# Patient Record
Sex: MALE | Race: WHITE | NOT HISPANIC OR LATINO | Employment: OTHER | URBAN - NONMETROPOLITAN AREA
[De-identification: names, ages, dates, MRNs, and addresses within clinical notes are randomized per-mention and may not be internally consistent; named-entity substitution may affect disease eponyms.]

---

## 2024-08-21 ENCOUNTER — OFFICE VISIT (OUTPATIENT)
Dept: FAMILY MEDICINE CLINIC | Facility: CLINIC | Age: 49
End: 2024-08-21
Payer: COMMERCIAL

## 2024-08-21 VITALS
DIASTOLIC BLOOD PRESSURE: 84 MMHG | TEMPERATURE: 97 F | OXYGEN SATURATION: 97 % | HEART RATE: 86 BPM | HEIGHT: 66 IN | BODY MASS INDEX: 26.52 KG/M2 | SYSTOLIC BLOOD PRESSURE: 120 MMHG | WEIGHT: 165 LBS

## 2024-08-21 DIAGNOSIS — Z12.5 SCREENING FOR PROSTATE CANCER: ICD-10-CM

## 2024-08-21 DIAGNOSIS — L98.9 SKIN LESION: ICD-10-CM

## 2024-08-21 DIAGNOSIS — R10.9 STOMACH PAIN: ICD-10-CM

## 2024-08-21 DIAGNOSIS — R19.7 DIARRHEA, UNSPECIFIED TYPE: ICD-10-CM

## 2024-08-21 DIAGNOSIS — F17.200 SMOKER: ICD-10-CM

## 2024-08-21 DIAGNOSIS — Z11.4 SCREENING FOR HIV (HUMAN IMMUNODEFICIENCY VIRUS): ICD-10-CM

## 2024-08-21 DIAGNOSIS — Z00.00 ENCOUNTER FOR MEDICAL EXAMINATION TO ESTABLISH CARE: Primary | ICD-10-CM

## 2024-08-21 PROCEDURE — 99213 OFFICE O/P EST LOW 20 MIN: CPT | Performed by: NURSE PRACTITIONER

## 2024-08-21 PROCEDURE — 99386 PREV VISIT NEW AGE 40-64: CPT | Performed by: NURSE PRACTITIONER

## 2024-08-21 NOTE — PROGRESS NOTES
Ambulatory Visit  Name: Raúl Cadena      : 1975      MRN: [id number]  Encounter Provider: LUIZ Awad  Encounter Date: 2024   [facility]  Pt is a 48 yr old male   Presents in office to establish care and due for annual physical   Has had some issues with diarrhea and stomach issues   Discussed work up ordered   He is a smoker.  Will need to see Derm for skin evaluation   Assessment & Plan   1. Encounter for medical examination to establish care  Comments:  discussed healthy diet and exercise   marisol quate hydration   preventative medicne  2. Stomach pain  -     Ambulatory Referral to Gastroenterology; Future  -     Comprehensive metabolic panel  -     CBC and differential  -     TSH, 3rd generation with Free T4 reflex  -     Lipid panel  -     Iron Panel (Includes Ferritin, Iron Sat%, Iron, and TIBC)  -     Sedimentation rate, automated  -     C-reactive protein  -     Lipase  -     Amylase  -     Hepatitis panel, acute  -     H. pylori antigen, stool  3. Diarrhea, unspecified type  -     H. pylori antigen, stool  4. Smoker  5. Screening for HIV (human immunodeficiency virus)  -     HIV 1/2 AG/AB w Reflex [facility] for 2 yr old and above  6. Skin lesion  -     Ambulatory Referral to Dermatology; Future  7. Screening for prostate cancer  -     PSA, Total Screen      Depression Screening and Follow-up Plan: Patient was screened for depression during today's encounter. They screened negative with a PHQ-2 score of 0.    Tobacco Cessation Counseling: Tobacco cessation counseling was provided. The patient is sincerely urged to quit consumption of tobacco. He is ready to quit tobacco. Medication options and side effects of medication discussed.       History of Present Illness     Pt is a 48 yr old male   Presents in office to establish care and due for annual physical   Has had some issues with diarrhea and stomach issues   Discussed work up ordered    He is a smoker.  Will need to see Derm for skin evaluation         Review of Systems   Constitutional:  Positive for fatigue. Negative for fever and unexpected weight change.   HENT:  Negative for congestion, hearing loss, sinus pressure and sore throat.    Eyes: Negative.    Respiratory:  Negative for cough and shortness of breath.         Smoker    Cardiovascular:  Negative for chest pain and palpitations.   Gastrointestinal:  Positive for abdominal distention and diarrhea. Negative for abdominal pain and nausea.   Endocrine: Negative.    Genitourinary:  Negative for difficulty urinating and flank pain.   Musculoskeletal:  Positive for arthralgias and myalgias.   Skin:  Negative for rash.   Neurological:  Negative for headaches.   Hematological:  Negative for adenopathy.   Psychiatric/Behavioral:  Negative for self-injury, sleep disturbance and suicidal ideas.      Pertinent Medical History   Reviewed       Medical History Reviewed by provider this encounter:  Tobacco  Allergies  Meds  Problems  Med Hx  Surg Hx  Fam Hx       Past Medical History   Past Medical History:   Diagnosis Date    COPD (chronic obstructive pulmonary disease) ([facility]) 2010    I usually get bronchitis at least once a year    GERD (gastroesophageal reflux disease)     Otitis media     Since birth     History reviewed. No pertinent surgical history.  History reviewed. No pertinent family history.  No current outpatient medications on file prior to visit.     No current facility-administered medications on file prior to visit.     Allergies   Allergen Reactions    Lactose - Food Allergy GI Intolerance      No current outpatient medications on file prior to visit.     No current facility-administered medications on file prior to visit.      Social History     Tobacco Use    Smoking status: Every Day     Current packs/day: 1.00     Average packs/day: 1 pack/day for 30.0 years (30.0 ttl pk-yrs)     Types: Cigarettes    Smokeless tobacco:  "Never   Vaping Use    Vaping status: Never Used   Substance and Sexual Activity    Alcohol use: Not Currently    Drug use: Never    Sexual activity: Yes     Partners: Female     Birth control/protection: None     Objective     /84 (BP Location: Left arm, Patient Position: Sitting, Cuff Size: Adult)   Pulse 86   Temp (!) 97 °F (36.1 °C)   Ht 5' 6\" (1.676 m)   Wt 74.8 kg (165 lb)   SpO2 97%   BMI 26.63 kg/m²     Physical Exam  Vitals and nursing note reviewed.   Constitutional:       General: He is not in acute distress.     Appearance: He is well-developed.   HENT:      Head: Normocephalic and atraumatic.   Eyes:      Conjunctiva/sclera: Conjunctivae normal.   Cardiovascular:      Rate and Rhythm: Normal rate and regular rhythm.      Heart sounds: No murmur heard.  Pulmonary:      Effort: Pulmonary effort is normal. No respiratory distress.      Breath sounds: Normal breath sounds.   Abdominal:      General: There is distension.      Palpations: Abdomen is soft.      Tenderness: There is no abdominal tenderness.   Musculoskeletal:         General: No swelling.      Cervical back: Neck supple.   Skin:     General: Skin is warm and dry.      Capillary Refill: Capillary refill takes less than 2 seconds.   Neurological:      General: No focal deficit present.      Mental Status: He is alert and oriented to person, place, and time.   Psychiatric:         Mood and Affect: Mood normal.         Behavior: Behavior normal.       Administrative Statements   I have spent a total time of 35  minutes in caring for this patient on the day of the visit/encounter including Risks and benefits of tx options, Instructions for management, Patient and family education, Importance of tx compliance, Risk factor reductions, Impressions, Counseling / Coordination of care, Documenting in the medical record, Reviewing / ordering tests, medicine, procedures  , and Obtaining or reviewing history  .      "

## 2024-08-22 ENCOUNTER — LAB (OUTPATIENT)
Dept: LAB | Facility: CLINIC | Age: 49
End: 2024-08-22
Payer: COMMERCIAL

## 2024-08-22 DIAGNOSIS — R10.9 STOMACH PAIN: Primary | ICD-10-CM

## 2024-08-22 LAB
ALBUMIN SERPL BCG-MCNC: 4.3 G/DL (ref 3.5–5)
ALP SERPL-CCNC: 107 U/L (ref 34–104)
ALT SERPL W P-5'-P-CCNC: 23 U/L (ref 7–52)
AMYLASE SERPL-CCNC: 28 IU/L (ref 29–103)
ANION GAP SERPL CALCULATED.3IONS-SCNC: 10 MMOL/L (ref 4–13)
AST SERPL W P-5'-P-CCNC: 19 U/L (ref 13–39)
BASOPHILS # BLD AUTO: 0.07 THOUSANDS/ÂΜL (ref 0–0.1)
BASOPHILS NFR BLD AUTO: 1 % (ref 0–1)
BILIRUB SERPL-MCNC: 0.56 MG/DL (ref 0.2–1)
BILIRUB UR QL STRIP: NEGATIVE
BUN SERPL-MCNC: 11 MG/DL (ref 5–25)
CALCIUM SERPL-MCNC: 9.5 MG/DL (ref 8.4–10.2)
CHLORIDE SERPL-SCNC: 102 MMOL/L (ref 96–108)
CHOLEST SERPL-MCNC: 240 MG/DL
CLARITY UR: CLEAR
CO2 SERPL-SCNC: 28 MMOL/L (ref 21–32)
COLOR UR: NORMAL
CREAT SERPL-MCNC: 0.97 MG/DL (ref 0.6–1.3)
CRP SERPL QL: 6.7 MG/L
EOSINOPHIL # BLD AUTO: 0.29 THOUSAND/ÂΜL (ref 0–0.61)
EOSINOPHIL NFR BLD AUTO: 3 % (ref 0–6)
ERYTHROCYTE [DISTWIDTH] IN BLOOD BY AUTOMATED COUNT: 13 % (ref 11.6–15.1)
ERYTHROCYTE [SEDIMENTATION RATE] IN BLOOD: 17 MM/HOUR (ref 0–14)
FERRITIN SERPL-MCNC: 91 NG/ML (ref 24–336)
GFR SERPL CREATININE-BSD FRML MDRD: 91 ML/MIN/1.73SQ M
GLUCOSE P FAST SERPL-MCNC: 68 MG/DL (ref 65–99)
GLUCOSE UR STRIP-MCNC: NEGATIVE MG/DL
HCT VFR BLD AUTO: 49 % (ref 36.5–49.3)
HDLC SERPL-MCNC: 26 MG/DL
HGB BLD-MCNC: 16.1 G/DL (ref 12–17)
HGB UR QL STRIP.AUTO: NEGATIVE
IMM GRANULOCYTES # BLD AUTO: 0.08 THOUSAND/UL (ref 0–0.2)
IMM GRANULOCYTES NFR BLD AUTO: 1 % (ref 0–2)
IRON SATN MFR SERPL: 41 % (ref 15–50)
IRON SERPL-MCNC: 132 UG/DL (ref 50–212)
KETONES UR STRIP-MCNC: NEGATIVE MG/DL
LDLC SERPL CALC-MCNC: 181 MG/DL (ref 0–100)
LEUKOCYTE ESTERASE UR QL STRIP: NEGATIVE
LIPASE SERPL-CCNC: 9 U/L (ref 11–82)
LYMPHOCYTES # BLD AUTO: 3.38 THOUSANDS/ÂΜL (ref 0.6–4.47)
LYMPHOCYTES NFR BLD AUTO: 32 % (ref 14–44)
MCH RBC QN AUTO: 31.4 PG (ref 26.8–34.3)
MCHC RBC AUTO-ENTMCNC: 32.9 G/DL (ref 31.4–37.4)
MCV RBC AUTO: 96 FL (ref 82–98)
MONOCYTES # BLD AUTO: 0.52 THOUSAND/ÂΜL (ref 0.17–1.22)
MONOCYTES NFR BLD AUTO: 5 % (ref 4–12)
NEUTROPHILS # BLD AUTO: 6.1 THOUSANDS/ÂΜL (ref 1.85–7.62)
NEUTS SEG NFR BLD AUTO: 58 % (ref 43–75)
NITRITE UR QL STRIP: NEGATIVE
NONHDLC SERPL-MCNC: 214 MG/DL
NRBC BLD AUTO-RTO: 0 /100 WBCS
PH UR STRIP.AUTO: 7.5 [PH]
PLATELET # BLD AUTO: 294 THOUSANDS/UL (ref 149–390)
PMV BLD AUTO: 9.5 FL (ref 8.9–12.7)
POTASSIUM SERPL-SCNC: 4.8 MMOL/L (ref 3.5–5.3)
PROT SERPL-MCNC: 6.8 G/DL (ref 6.4–8.4)
PROT UR STRIP-MCNC: NEGATIVE MG/DL
PSA SERPL-MCNC: 0.7 NG/ML (ref 0–4)
RBC # BLD AUTO: 5.12 MILLION/UL (ref 3.88–5.62)
SODIUM SERPL-SCNC: 140 MMOL/L (ref 135–147)
SP GR UR STRIP.AUTO: 1.01 (ref 1–1.03)
TIBC SERPL-MCNC: 325 UG/DL (ref 250–450)
TRIGL SERPL-MCNC: 163 MG/DL
TSH SERPL DL<=0.05 MIU/L-ACNC: 2.26 UIU/ML (ref 0.45–4.5)
UIBC SERPL-MCNC: 193 UG/DL (ref 155–355)
UROBILINOGEN UR STRIP-ACNC: <2 MG/DL
WBC # BLD AUTO: 10.44 THOUSAND/UL (ref 4.31–10.16)

## 2024-08-22 PROCEDURE — 36415 COLL VENOUS BLD VENIPUNCTURE: CPT | Performed by: NURSE PRACTITIONER

## 2024-08-22 PROCEDURE — 82150 ASSAY OF AMYLASE: CPT | Performed by: NURSE PRACTITIONER

## 2024-08-22 PROCEDURE — 85025 COMPLETE CBC W/AUTO DIFF WBC: CPT | Performed by: NURSE PRACTITIONER

## 2024-08-22 PROCEDURE — 86140 C-REACTIVE PROTEIN: CPT | Performed by: NURSE PRACTITIONER

## 2024-08-22 PROCEDURE — 83540 ASSAY OF IRON: CPT | Performed by: NURSE PRACTITIONER

## 2024-08-22 PROCEDURE — 85652 RBC SED RATE AUTOMATED: CPT | Performed by: NURSE PRACTITIONER

## 2024-08-22 PROCEDURE — 80053 COMPREHEN METABOLIC PANEL: CPT | Performed by: NURSE PRACTITIONER

## 2024-08-22 PROCEDURE — 83690 ASSAY OF LIPASE: CPT | Performed by: NURSE PRACTITIONER

## 2024-08-22 PROCEDURE — 80074 ACUTE HEPATITIS PANEL: CPT | Performed by: NURSE PRACTITIONER

## 2024-08-22 PROCEDURE — 87389 HIV-1 AG W/HIV-1&-2 AB AG IA: CPT | Performed by: NURSE PRACTITIONER

## 2024-08-22 PROCEDURE — 80061 LIPID PANEL: CPT | Performed by: NURSE PRACTITIONER

## 2024-08-22 PROCEDURE — G0103 PSA SCREENING: HCPCS | Performed by: NURSE PRACTITIONER

## 2024-08-22 PROCEDURE — 83550 IRON BINDING TEST: CPT | Performed by: NURSE PRACTITIONER

## 2024-08-22 PROCEDURE — 82728 ASSAY OF FERRITIN: CPT | Performed by: NURSE PRACTITIONER

## 2024-08-22 PROCEDURE — 84443 ASSAY THYROID STIM HORMONE: CPT | Performed by: NURSE PRACTITIONER

## 2024-08-22 PROCEDURE — 81003 URINALYSIS AUTO W/O SCOPE: CPT

## 2024-08-23 ENCOUNTER — LAB (OUTPATIENT)
Dept: LAB | Facility: CLINIC | Age: 49
End: 2024-08-23
Payer: COMMERCIAL

## 2024-08-23 ENCOUNTER — TELEPHONE (OUTPATIENT)
Dept: FAMILY MEDICINE CLINIC | Facility: CLINIC | Age: 49
End: 2024-08-23

## 2024-08-23 DIAGNOSIS — R10.9 STOMACH ACHE: Primary | ICD-10-CM

## 2024-08-23 LAB
HAV IGM SER QL: NORMAL
HBV CORE IGM SER QL: NORMAL
HBV SURFACE AG SER QL: NORMAL
HCV AB SER QL: NORMAL
HIV 1+2 AB+HIV1 P24 AG SERPL QL IA: NORMAL
HIV 2 AB SERPL QL IA: NORMAL
HIV1 AB SERPL QL IA: NORMAL
HIV1 P24 AG SERPL QL IA: NORMAL

## 2024-08-23 PROCEDURE — 87505 NFCT AGENT DETECTION GI: CPT

## 2024-08-23 PROCEDURE — 87338 HPYLORI STOOL AG IA: CPT | Performed by: NURSE PRACTITIONER

## 2024-08-23 NOTE — TELEPHONE ENCOUNTER
Patient returned call. Advised of Harriet Chou's message regarding lab results.     Patient agreed to discuss further at next appt in September  
Vera Filipovska, CRNP  [facility]  Slight elevation of liver enzymes  Could be do to high cholesterol and triglycerides  Will discuss further management in office  For now low fat low cholesterol diet  Hydrate well    LMOM for patient to call back to review   
Yes - the patient is able to be screened

## 2024-08-23 NOTE — RESULT ENCOUNTER NOTE
Slight elevation of liver enzymes   Could be do to high cholesterol and triglycerides   Will discuss further management in office   For now low fat low cholesterol diet   Hydrate well

## 2024-08-24 LAB
C COLI+JEJUNI TUF STL QL NAA+PROBE: NEGATIVE
EC STX1+STX2 GENES STL QL NAA+PROBE: NEGATIVE
SALMONELLA SP SPAO STL QL NAA+PROBE: NEGATIVE
SHIGELLA SP+EIEC IPAH STL QL NAA+PROBE: NEGATIVE

## 2024-08-26 LAB — H PYLORI AG STL QL IA: NEGATIVE

## 2024-08-29 ENCOUNTER — CONSULT (OUTPATIENT)
Dept: GASTROENTEROLOGY | Facility: CLINIC | Age: 49
End: 2024-08-29
Payer: COMMERCIAL

## 2024-08-29 VITALS
OXYGEN SATURATION: 97 % | TEMPERATURE: 97.3 F | HEIGHT: 66 IN | DIASTOLIC BLOOD PRESSURE: 76 MMHG | BODY MASS INDEX: 26.26 KG/M2 | SYSTOLIC BLOOD PRESSURE: 114 MMHG | HEART RATE: 72 BPM | WEIGHT: 163.4 LBS

## 2024-08-29 DIAGNOSIS — Z12.11 SCREENING FOR COLON CANCER: ICD-10-CM

## 2024-08-29 DIAGNOSIS — R74.8 ALKALINE PHOSPHATASE ELEVATION: Primary | ICD-10-CM

## 2024-08-29 DIAGNOSIS — R10.9 STOMACH PAIN: ICD-10-CM

## 2024-08-29 DIAGNOSIS — R19.7 DIARRHEA, UNSPECIFIED TYPE: ICD-10-CM

## 2024-08-29 DIAGNOSIS — K64.4 EXTERNAL HEMORRHOID: ICD-10-CM

## 2024-08-29 DIAGNOSIS — R19.4 CHANGE IN BOWEL HABITS: ICD-10-CM

## 2024-08-29 PROCEDURE — 99204 OFFICE O/P NEW MOD 45 MIN: CPT | Performed by: PHYSICIAN ASSISTANT

## 2024-08-29 RX ORDER — DICYCLOMINE HCL 20 MG
20 TABLET ORAL EVERY 6 HOURS PRN
Qty: 45 TABLET | Refills: 0 | Status: SHIPPED | OUTPATIENT
Start: 2024-08-29

## 2024-08-29 RX ORDER — HYDROCORTISONE 25 MG/G
CREAM TOPICAL 2 TIMES DAILY
Qty: 28 G | Refills: 0 | Status: SHIPPED | OUTPATIENT
Start: 2024-08-29

## 2024-08-29 NOTE — PROGRESS NOTES
[facility]  Raúl Cadena 48 y.o. male MRN: [id number]       CC: Change in bowel habits, chronic rectal bleeding and diarrhea    HPI: Raúl is a 48-year-old male who was referred to our office by his PCP for change in bowel habits.  Patient reports that in mid August, he began having lower abdominal cramping and diarrhea up to 15 times a day.  Patient was sent for stool testing which was negative for stool enteric pathogens and H. pylori.  In addition he had blood work showing very mild leukocytosis at 10,000, and mildly elevated alkaline phosphatase at 107.  Otherwise, remainder of labs were unremarkable.  Patient has never had a colonoscopy, and there is no family history of IBD or colon cancer/polyps to his knowledge.  Fortunately, his bowel movements are down to 2-3 times a day, but not back to his baseline.  Patient reports that he struggles with external hemorrhoids for many years due to weight lifting, and has increased rectal bleeding when wiping after the diarrhea episode.  Patient reports that rectal bleeding has been chronic for him, and recently he had a friend get diagnosed with colon cancer which also encouraged him to be seen by gastroenterology.      Review of Systems:    CONSTITUTIONAL: Denies any fever, chills, or rigors. Good appetite, and no recent weight loss.  HEENT: No earache or tinnitus. Denies hearing loss or visual disturbances.  CARDIOVASCULAR: No chest pain or palpitations.   RESPIRATORY: Denies any cough, hemoptysis, shortness of breath or dyspnea on exertion.  GASTROINTESTINAL: As noted in the History of Present Illness.   GENITOURINARY: No problems with urination. Denies any hematuria or dysuria.  NEUROLOGIC: No dizziness or vertigo, denies headaches.   MUSCULOSKELETAL: Denies any muscle or joint pain.   SKIN: Denies skin rashes or itching.   ENDOCRINE: Denies excessive thirst. Denies intolerance to heat or cold.  PSYCHOSOCIAL: Denies depression or anxiety.  Denies any recent memory loss.       No current outpatient medications on file.     No current facility-administered medications for this visit.     Past Medical History:   Diagnosis Date    COPD (chronic obstructive pulmonary disease) ([facility]) 2010    I usually get bronchitis at least once a year    GERD (gastroesophageal reflux disease)     Otitis media     Since birth     No past surgical history on file.  Social History     Socioeconomic History    Marital status:      Spouse name: Not on file    Number of children: Not on file    Years of education: Not on file    Highest education level: Not on file   Occupational History    Not on file   Tobacco Use    Smoking status: Every Day     Current packs/day: 1.00     Average packs/day: 1 pack/day for 30.0 years (30.0 ttl pk-yrs)     Types: Cigarettes    Smokeless tobacco: Never   Vaping Use    Vaping status: Never Used   Substance and Sexual Activity    Alcohol use: Not Currently    Drug use: Never    Sexual activity: Yes     Partners: Female     Birth control/protection: None   Other Topics Concern    Not on file   Social History Narrative    Not on file     Social Determinants of Health     Financial Resource Strain: Not on file   Food Insecurity: Not on file   Transportation Needs: Not on file   Physical Activity: Not on file   Stress: Not on file   Social Connections: Not on file   Intimate Partner Violence: Not on file   Housing Stability: Not on file     No family history on file.         PHYSICAL EXAM:    There were no vitals filed for this visit.  General Appearance:   Alert and oriented x 3. Cooperative, and in no respiratory distress   HEENT:   Normocephalic, atraumatic, anicteric.     Neck:  Supple, symmetrical, trachea midline   Lungs:   Clear to auscultation bilaterally    Heart::   Regular rate and rhythm   Abdomen:   Soft, non-tender, non-distended; normal bowel sounds; no masses, no organomegaly    Genitalia:   Deferred    Rectal:   Deferred     Extremities:  No cyanosis, clubbing or edema    Pulses:  2+ and symmetric all extremities    Skin:  Skin color, texture, turgor normal, no rashes or lesions    Lymph nodes:  No palpable cervical or supraclavicular lymphadenopathy        Lab Results:   Results from last 6 Months   Lab Units 08/22/24  1006   WBC Thousand/uL 10.44*   HEMOGLOBIN g/dL 16.1   HEMATOCRIT % 49.0   PLATELETS Thousands/uL 294   SEGS PCT % 58   LYMPHO PCT % 32   MONO PCT % 5   EOS PCT % 3     Results from last 6 Months   Lab Units 08/22/24  1006   POTASSIUM mmol/L 4.8   CHLORIDE mmol/L 102   CO2 mmol/L 28   BUN mg/dL 11   CREATININE mg/dL 0.97   CALCIUM mg/dL 9.5   ALK PHOS U/L 107*   ALT U/L 23   AST U/L 19         Results from last 6 Months   Lab Units 08/22/24  1006   LIPASE u/L 9*   AMYLASE IU/L 28*       Imaging Studies:   Patient was never admitted.    ASSESSMENT and PLAN:      1) Change in bowel habits, acute onset diarrhea and lower abdominal cramping - May be secondary to infectious etiology such as viral gastroenteritis, mild diverticulitis, less likely IBD, colon polyp, malignancy, etc.  Will plan for colonoscopy to investigate.  This will be his first colonoscopy.  We will go over MiraLAX prep instructions the office. I obtained informed consent from the patient. The risks/benefits/alternatives of the procedure were discussed with the patient. Risks included, but not limited to, infection, bleeding, perforation, injury to organs in the abdomen, missed lesion and incomplete procedure were discussed. Patient was agreeable.    2) Mildly elevated alkaline phosphatase - May be reactive from infectious process.  Denies herbal supplement usage, alcohol abuse history and his hepatitis panel was negative.  Recheck alkaline phosphatase in 3 months.  Further testing with full liver workup and ultrasound imaging if LFTs are elevated.    3) Chronic bright red blood per rectum - May have been aggravated by above.  He has been using Preparation  "H chronically.  Will send Anusol to the pharmacy.  Fortunately, hemoglobin 16.1.  Will be investigated during colonoscopy for other causes.      Follow up in 3 months.    Portions of the record may have been created with voice recognition software.  Occasional wrong word or \"sound a like\" substitutions may have occurred due to the inherent limitations of voice recognition software.  Read the chart carefully and recognize, using context, where substitutions have occurred.  "

## 2024-08-29 NOTE — PATIENT INSTRUCTIONS
Scheduled date of colonoscopy (as of today):9/18/24  Physician performing colonoscopy:Travon  Location of colonoscopy:[place]  Bowel prep reviewed with patient:Lizeth/Miralax  Instructions reviewed with patient by:Harsh begum  Clearances:  none

## 2024-09-09 ENCOUNTER — TELEPHONE (OUTPATIENT)
Dept: FAMILY MEDICINE CLINIC | Facility: CLINIC | Age: 49
End: 2024-09-09

## 2024-09-09 NOTE — TELEPHONE ENCOUNTER
Test results SEEN BY PT 8/24/2024    NEXT APPT  With Family Medicine (LUIZ Awad)  09/11/2024 at 11:00 AM

## 2024-09-11 ENCOUNTER — OFFICE VISIT (OUTPATIENT)
Dept: FAMILY MEDICINE CLINIC | Facility: CLINIC | Age: 49
End: 2024-09-11
Payer: COMMERCIAL

## 2024-09-11 VITALS
WEIGHT: 159 LBS | TEMPERATURE: 97.8 F | DIASTOLIC BLOOD PRESSURE: 84 MMHG | HEIGHT: 66 IN | BODY MASS INDEX: 25.55 KG/M2 | OXYGEN SATURATION: 97 % | HEART RATE: 80 BPM | SYSTOLIC BLOOD PRESSURE: 122 MMHG

## 2024-09-11 DIAGNOSIS — R19.7 DIARRHEA, UNSPECIFIED TYPE: ICD-10-CM

## 2024-09-11 DIAGNOSIS — R74.8 ELEVATED ALKALINE PHOSPHATASE LEVEL: ICD-10-CM

## 2024-09-11 DIAGNOSIS — F17.213 CIGARETTE NICOTINE DEPENDENCE WITH WITHDRAWAL: ICD-10-CM

## 2024-09-11 DIAGNOSIS — D72.829 LEUKOCYTOSIS, UNSPECIFIED TYPE: ICD-10-CM

## 2024-09-11 DIAGNOSIS — E78.49 OTHER HYPERLIPIDEMIA: Primary | ICD-10-CM

## 2024-09-11 DIAGNOSIS — R70.0 ELEVATED SED RATE: ICD-10-CM

## 2024-09-11 PROCEDURE — 99214 OFFICE O/P EST MOD 30 MIN: CPT | Performed by: NURSE PRACTITIONER

## 2024-09-11 RX ORDER — VARENICLINE TARTRATE 1 MG/1
1 TABLET, FILM COATED ORAL 2 TIMES DAILY
Qty: 60 TABLET | Refills: 1 | Status: SHIPPED | OUTPATIENT
Start: 2024-09-11

## 2024-09-11 RX ORDER — VARENICLINE TARTRATE 0.5 (11)-1
KIT ORAL
Qty: 53 EACH | Refills: 0 | Status: SHIPPED | OUTPATIENT
Start: 2024-09-11

## 2024-09-11 NOTE — Clinical Note
Can you check on him and see how he is doing with medications for smoking  and was his colonoscopy cancelled ? Was kt rescheduled ?

## 2024-09-11 NOTE — PROGRESS NOTES
Ambulatory Visit  Name: Raúl Cadena      : 1975      MRN: [id number]  Encounter Provider: LUIZ Awad  Encounter Date: 2024   Encounter department: [facility]    Pt is a 48 yr old male   Presents in office for follow up and lab review   Continues to have diarrhea  was seen by GI plan is for colonoscopy - currently on Bentyl  Reviewed labs and will see him back after all work up completed   Assessment & Plan  Other hyperlipidemia  Hyperlipidemia diagnoses assessed and discussed   Reviewed medications and plan of care   Labs reviewed   2024   Discussed low fat low cholesterol diet   Discussed exercise and adequate hydration   Will continue to monitor every 4 months     Orders:    Comprehensive metabolic panel    CBC and differential    TSH, 3rd generation with Free T4 reflex    UA/M w/rflx Culture, Routine    Sedimentation rate, automated    C-reactive protein    Cigarette nicotine dependence with withdrawal  Discussed smoking cessation   Discussed treatment options  and follow up in 4-6 weeks   Orders:    Varenicline Tartrate, Starter, (Chantix Starting Month [facility]) 0.5 MG X 11 & 1 MG X 42 TBPK; 0.5 mg once daily for 3 days then 0.5mg twice daily for days 4-7 then 1 mg twice daily    Comprehensive metabolic panel    CBC and differential    TSH, 3rd generation with Free T4 reflex    UA/M w/rflx Culture, Routine    Sedimentation rate, automated    C-reactive protein    Elevated alkaline phosphatase level  Was seen by GI   Awaiting colonoscopy   Orders:    varenicline (Chantix Continuing Month ) 1 mg tablet; Take 1 tablet (1 mg total) by mouth 2 (two) times a day    Elevated sed rate         Diarrhea, unspecified type  Will follow up   In mids of work up   Hydrate well   Electrolytes water   BRAT diet         Leukocytosis, unspecified type  Discussed smoking cessation encouraged to get work up done          Depression Screening and Follow-up Plan: Patient  was screened for depression during today's encounter. They screened negative with a PHQ-2 score of 0.    Tobacco Cessation Counseling: Tobacco cessation counseling was provided. The patient is sincerely urged to quit consumption of tobacco. He is ready to quit tobacco. Side effects of medication discussed. Patient agreed to medication. Varenicline (chantix) was prescribed.       History of Present Illness     Pt is a 48 yr old male   Presents in office for follow up and lab review   Continues to have diarrhea  was seen by GI plan is for colonoscopy - currently on Bentyl  Reviewed labs and will see him back after all work up completed         History obtained from : patient  Review of Systems   Constitutional:  Positive for fatigue. Negative for fever and unexpected weight change.   HENT:  Negative for congestion, hearing loss, sinus pressure and sore throat.    Eyes: Negative.    Respiratory:  Negative for cough and shortness of breath.         Smoker    Cardiovascular:  Negative for chest pain and palpitations.        Hyperlipidemia      Gastrointestinal:  Positive for abdominal distention and diarrhea. Negative for abdominal pain and nausea.   Endocrine: Negative.    Genitourinary:  Negative for difficulty urinating and flank pain.   Musculoskeletal:  Positive for arthralgias and myalgias.   Skin:  Negative for rash.   Neurological:  Negative for headaches.   Hematological:  Negative for adenopathy.   Psychiatric/Behavioral:  Negative for self-injury, sleep disturbance and suicidal ideas.      Pertinent Medical History     Reviewed       Medical History Reviewed by provider this encounter:  Tobacco  Allergies  Meds  Problems  Med Hx  Surg Hx  Fam Hx       Past Medical History   Past Medical History:   Diagnosis Date    COPD (chronic obstructive pulmonary disease) ([facility]) 2010    I usually get bronchitis at least once a year    GERD (gastroesophageal reflux disease)     Lactose intolerance     Otitis media      "Since birth     History reviewed. No pertinent surgical history.  History reviewed. No pertinent family history.  Current Outpatient Medications on File Prior to Visit   Medication Sig Dispense Refill    dicyclomine (BENTYL) 20 mg tablet Take 1 tablet (20 mg total) by mouth every 6 (six) hours as needed (abd cramping) 45 tablet 0    hydrocortisone (ANUSOL-HC) 2.5 % rectal cream Apply topically 2 (two) times a day 28 g 0     No current facility-administered medications on file prior to visit.     Allergies   Allergen Reactions    Lactose - Food Allergy GI Intolerance      Current Outpatient Medications on File Prior to Visit   Medication Sig Dispense Refill    dicyclomine (BENTYL) 20 mg tablet Take 1 tablet (20 mg total) by mouth every 6 (six) hours as needed (abd cramping) 45 tablet 0    hydrocortisone (ANUSOL-HC) 2.5 % rectal cream Apply topically 2 (two) times a day 28 g 0     No current facility-administered medications on file prior to visit.      Social History     Tobacco Use    Smoking status: Every Day     Current packs/day: 1.00     Average packs/day: 1 pack/day for 60.8 years (60.8 ttl pk-yrs)     Types: Cigarettes     Start date: 1/1/1994    Smokeless tobacco: Never    Tobacco comments:     I have tried Chantix but having PTSD.. i stopped taking it due to severe nightmares   Vaping Use    Vaping status: Never Used   Substance and Sexual Activity    Alcohol use: Not Currently    Drug use: Never    Sexual activity: Yes     Partners: Female     Birth control/protection: None         Objective     /84 (BP Location: Left arm, Patient Position: Sitting, Cuff Size: Adult)   Pulse 80   Temp 97.8 °F (36.6 °C)   Ht 5' 6\" (1.676 m)   Wt 72.1 kg (159 lb)   SpO2 97%   BMI 25.66 kg/m²     Physical Exam  Vitals and nursing note reviewed.   Constitutional:       General: He is not in acute distress.     Appearance: He is well-developed.   HENT:      Head: Normocephalic and atraumatic.   Eyes:      " Conjunctiva/sclera: Conjunctivae normal.   Cardiovascular:      Rate and Rhythm: Normal rate and regular rhythm.      Heart sounds: No murmur heard.  Pulmonary:      Effort: Pulmonary effort is normal. No respiratory distress.      Breath sounds: Normal breath sounds.   Abdominal:      General: There is distension.      Palpations: Abdomen is soft.      Tenderness: There is no abdominal tenderness.   Musculoskeletal:         General: No swelling.      Cervical back: Neck supple.   Skin:     General: Skin is warm and dry.      Capillary Refill: Capillary refill takes less than 2 seconds.   Neurological:      General: No focal deficit present.      Mental Status: He is alert and oriented to person, place, and time.   Psychiatric:         Mood and Affect: Mood normal.         Behavior: Behavior normal.          Contains abnormal data Comprehensive metabolic panel  Order: [id number]   Status: Final result       Visible to patient: Yes (seen)       Next appt: 10/16/2024 at 08:30 AM in Plastic Surgery (Leah Pickard PA-C)       Dx: Stomach pain    3 Result Notes       1 Patient Communication      Component  Ref Range & Units 8/22/24 10:06 AM   Sodium  135 - 147 mmol/L 140   Potassium  3.5 - 5.3 mmol/L 4.8   Chloride  96 - 108 mmol/L 102   CO2  21 - 32 mmol/L 28   ANION GAP  4 - 13 mmol/L 10   BUN  5 - 25 mg/dL 11   Creatinine  0.60 - 1.30 mg/dL 0.97   Comment: Standardized to IDMS reference method   Glucose, Fasting  65 - 99 mg/dL 68   Calcium  8.4 - 10.2 mg/dL 9.5   AST  13 - 39 U/L 19   ALT  7 - 52 U/L 23   Comment: Specimen collection should occur prior to Sulfasalazine administration due to the potential for falsely depressed results.   Alkaline Phosphatase  34 - 104 U/L 107 High    Total Protein  6.4 - 8.4 g/dL 6.8   Albumin  3.5 - 5.0 g/dL 4.3   Total Bilirubin  0.20 - 1.00 mg/dL 0.56   Comment: Use of this assay is not recommended for patients undergoing treatment with eltrombopag due to the potential for  falsely elevated results.  N-acetyl-p-benzoquinone imine (metabolite of Acetaminophen) will generate erroneously low results in samples for patients that have taken an overdose of Acetaminophen.   eGFR  ml/min/1.73sq m 91              Narrative    National Kidney Disease Foundation guidelines for Chronic Kidney Disease (CKD):    Stage 1 with normal or high GFR (GFR > 90 mL/min/1.73 square meters)    Stage 2 Mild CKD (GFR = 60-89 mL/min/1.73 square meters)    Stage 3A Moderate CKD (GFR = 45-59 mL/min/1.73 square meters)    Stage 3B Moderate CKD (GFR = 30-44 mL/min/1.73 square meters)    Stage 4 Severe CKD (GFR = 15-29 mL/min/1.73 square meters)    Stage 5 End Stage CKD (GFR <15 mL/min/1.73 square meters)  Note: GFR calculation is accurate only with a steady state creatinine      Specimen Collected: 08/22/24 10:06 AM Last Resulted: 08/22/24  7:27 PM         Contains abnormal data CBC and differential  Order: [id number]   Status: Final result       Visible to patient: Yes (seen)       Next appt: 10/16/2024 at 08:30 AM in Plastic Surgery (Leah Pickard PA-C)       Dx: Stomach pain    3 Result Notes       1 Patient Communication      Component  Ref Range & Units 8/22/24 10:06 AM   WBC  4.31 - 10.16 Thousand/uL 10.44 High    RBC  3.88 - 5.62 Million/uL 5.12   Hemoglobin  12.0 - 17.0 g/dL 16.1   Hematocrit  36.5 - 49.3 % 49.0   MCV  82 - 98 fL 96   MCH  26.8 - 34.3 pg 31.4   MCHC  31.4 - 37.4 g/dL 32.9   RDW  11.6 - 15.1 % 13.0   MPV  8.9 - 12.7 fL 9.5   Platelets  149 - 390 Thousands/uL 294   nRBC  /100 WBCs 0   Segmented %  43 - 75 % 58   Immature Grans %  0 - 2 % 1   Lymphocytes %  14 - 44 % 32   Monocytes %  4 - 12 % 5   Eosinophils Relative  0 - 6 % 3   Basophils Relative  0 - 1 % 1   Absolute Neutrophils  1.85 - 7.62 Thousands/µL 6.10   Absolute Immature Grans  0.00 - 0.20 Thousand/uL 0.08   Absolute Lymphocytes  0.60 - 4.47 Thousands/µL 3.38   Absolute Monocytes  0.17 - 1.22 Thousand/µL 0.52   Eosinophils  Absolute  0.00 - 0.61 Thousand/µL 0.29   Basophils Absolute  0.00 - 0.10 Thousands/µL 0.07        TSH, 3rd generation with Free T4 reflex  Order: [id number]   Status: Final result       Visible to patient: Yes (seen)       Next appt: 10/16/2024 at 08:30 AM in Plastic Surgery (Leah Pickard PA-C)       Dx: Stomach pain    0 Result Notes      Component  Ref Range & Units 8/22/24 10:06 AM   TSH 3RD GENERATON  0.450 - 4.500 uIU/mL 2.264       Contains abnormal data Lipid panel  Order: [id number]   Status: Final result       Visible to patient: Yes (seen)       Next appt: 10/16/2024 at 08:30 AM in Plastic Surgery (Leah Pickard PA-C)       Dx: Stomach pain    3 Result Notes       1 Patient Communication      Component  Ref Range & Units 8/22/24 10:06 AM   Cholesterol  See Comment mg/dL 240 High    Comment: Cholesterol:        Pediatric <18 Years        Desirable          <170 mg/dL      Borderline High    170-199 mg/dL      High               >=200 mg/dL        Adult >=18 Years           Desirable         <200 mg/dL      Borderline High   200-239 mg/dL      High             >239 mg/dL   Triglycerides  See Comment mg/dL 163 High    Comment: Triglyceride:     0-9Y            <75mg/dL     10Y-17Y         <90 mg/dL       >=18Y     Normal          <150 mg/dL     Borderline High 150-199 mg/dL     High            200-499 mg/dL       Very High       >499 mg/dL    Specimen collection should occur prior to Metamizole administration due to the potential for falsely depressed results.   HDL, Direct  >=40 mg/dL 26 Low    LDL Calculated  0 - 100 mg/dL 181 High    Comment: LDL Cholesterol:    Optimal           <100 mg/dl    Near Optimal      100-129 mg/dl    Above Optimal      Borderline High 130-159 mg/dl      High            160-189 mg/dl      Very High       >189 mg/dl         This screening LDL is a calculated result.  It does not have the accuracy of the Direct Measured LDL in the monitoring of patients with hyperlipidemia  and/or statin therapy.  Direct Measure LDL (FMT551) must be ordered separately in these patients.   Non-HDL-Chol (CHOL-HDL)  mg/dl 214              Specimen Collected: 08/22/24 10:06 AM           Administrative Statements   I have spent a total time of 35  minutes in caring for this patient on the day of the visit/encounter including Risks and benefits of tx options, Instructions for management, Patient and family education, Importance of tx compliance, Risk factor reductions, Impressions, Counseling / Coordination of care, Documenting in the medical record, Reviewing / ordering tests, medicine, procedures  , Obtaining or reviewing history  , and Communicating with other healthcare professionals .

## 2024-09-12 ENCOUNTER — OFFICE VISIT (OUTPATIENT)
Dept: PLASTIC SURGERY | Facility: CLINIC | Age: 49
End: 2024-09-12
Payer: COMMERCIAL

## 2024-09-12 DIAGNOSIS — L98.9 SKIN LESION: Primary | ICD-10-CM

## 2024-09-12 PROCEDURE — 11104 PUNCH BX SKIN SINGLE LESION: CPT | Performed by: PHYSICIAN ASSISTANT

## 2024-09-12 PROCEDURE — 88305 TISSUE EXAM BY PATHOLOGIST: CPT | Performed by: PATHOLOGY

## 2024-09-12 PROCEDURE — 99203 OFFICE O/P NEW LOW 30 MIN: CPT | Performed by: PHYSICIAN ASSISTANT

## 2024-09-12 NOTE — PROGRESS NOTES
Biopsy    Date/Time: 9/12/2024 1:00 PM    Performed by: Supriya Hair PA-C  Authorized by: Supriya Hair PA-C  Universal Protocol:  procedure performed by consultantConsent: Verbal consent obtained.  Risks and benefits: risks, benefits and alternatives were discussed  Consent given by: patient  Patient understanding: patient states understanding of the procedure being performed  Patient consent: the patient's understanding of the procedure matches consent given  Procedure consent: procedure consent matches procedure scheduled  Patient identity confirmed: verbally with patient    Procedure Details - Lesion Biopsy:     Body area:  Head/neck    Head/neck location:  L eyebrow    Biopsy method: punch biopsy      Biopsy tissue type: skin    Initial size (mm):  2    Final defect size (mm):  2    Malignancy: malignancy unknown

## 2024-09-12 NOTE — PROGRESS NOTES
Assessment & Plan      Active Problems:  There are no active Hospital Problems.    Pt is a 48 YOM who presents to the office as a new patient due to concerns of a nonhealing skin lesion of the left eyebrow.  Please see HPI.    The area was cleaned, prepped and anesthetized and a punch biopsy was performed.  We will call patient with pathology results and proceed with surgical intervention as indicated.       Subjective     Raúl Cadena is an 48 y.o. male.    HPI:  Patient reports that he has had a nonhealing skin lesion of his left eyebrow for approximately 6 months.  It will scab, the scab will fall off and then form again.  Upon evaluation, the patient has an approximately 0.5 x 0.5 cm raised, scaling, erythematous skin lesion of the left eyebrow.  Please see photo in media.     No past surgical history on file.    Review of Systems  A 12 point review of systems was completed and is negative except as per HPI    Objective     There were no vitals taken for this visit.    Physical Exam  Vitals and nursing note reviewed.   Constitutional:       General: He is not in acute distress.     Appearance: Normal appearance. He is normal weight. He is not ill-appearing, toxic-appearing or diaphoretic.   HENT:      Head: Normocephalic and atraumatic.      Nose: Nose normal.      Mouth/Throat:      Mouth: Mucous membranes are moist.   Eyes:      Extraocular Movements: Extraocular movements intact.      Conjunctiva/sclera: Conjunctivae normal.      Pupils: Pupils are equal, round, and reactive to light.   Cardiovascular:      Rate and Rhythm: Normal rate and regular rhythm.   Pulmonary:      Effort: Pulmonary effort is normal. No respiratory distress.      Breath sounds: Normal breath sounds.   Abdominal:      General: Abdomen is flat.      Palpations: Abdomen is soft.   Musculoskeletal:         General: No swelling, tenderness, deformity or signs of injury. Normal range of motion.      Cervical back: Normal range of motion  and neck supple. No rigidity or tenderness.      Right lower leg: No edema.      Left lower leg: No edema.   Skin:     General: Skin is warm and dry.      Comments: See HPI   Neurological:      General: No focal deficit present.      Mental Status: He is alert and oriented to person, place, and time.      Cranial Nerves: No cranial nerve deficit.      Sensory: No sensory deficit.      Motor: No weakness.   Psychiatric:         Mood and Affect: Mood normal.         Behavior: Behavior normal.           .SOC

## 2024-09-19 PROCEDURE — 88305 TISSUE EXAM BY PATHOLOGIST: CPT | Performed by: PATHOLOGY

## 2024-09-20 ENCOUNTER — TELEPHONE (OUTPATIENT)
Dept: PLASTIC SURGERY | Facility: CLINIC | Age: 49
End: 2024-09-20

## 2024-09-20 NOTE — TELEPHONE ENCOUNTER
Called patient with biopsy results which were consistent with basal cell carcinoma.  Will call patient to schedule preoperative appointment in the office.

## 2024-10-08 PROBLEM — K64.5 HEMORRHOIDS, EXTERNAL, THROMBOSED: Status: ACTIVE | Noted: 2024-09-11

## 2024-10-09 ENCOUNTER — TELEPHONE (OUTPATIENT)
Dept: FAMILY MEDICINE CLINIC | Facility: CLINIC | Age: 49
End: 2024-10-09

## 2024-10-09 NOTE — TELEPHONE ENCOUNTER
Vera Filipovska, CRNP  [facility]  Can you check on him and see how he is doing with medications for smoking  and was his colonoscopy cancelled ? Was kt rescheduled ?      Patient states everything is okay but it is not a good time to talk right now further. His truck broke down last night so he is trying to make up time. He states he will call back another time

## 2024-10-16 ENCOUNTER — OFFICE VISIT (OUTPATIENT)
Dept: PLASTIC SURGERY | Facility: CLINIC | Age: 49
End: 2024-10-16
Payer: COMMERCIAL

## 2024-10-16 VITALS
WEIGHT: 162.7 LBS | SYSTOLIC BLOOD PRESSURE: 127 MMHG | DIASTOLIC BLOOD PRESSURE: 80 MMHG | TEMPERATURE: 97.6 F | BODY MASS INDEX: 26.15 KG/M2 | HEART RATE: 67 BPM | HEIGHT: 66 IN

## 2024-10-16 DIAGNOSIS — C44.310 BCC (BASAL CELL CARCINOMA), FACE: Primary | ICD-10-CM

## 2024-10-16 PROCEDURE — 99213 OFFICE O/P EST LOW 20 MIN: CPT

## 2024-10-17 ENCOUNTER — TELEPHONE (OUTPATIENT)
Dept: DERMATOLOGY | Facility: CLINIC | Age: 49
End: 2024-10-17

## 2024-10-17 NOTE — TELEPHONE ENCOUNTER
Pre- operative Mohs Telephone Scheduling Note    Do you have a pacemaker, defibrillator, spinal or brain stimulator? no    Do you take antibiotics before skin or dental procedures? no  If yes, will likely require pre-operative antibiotics. Ask  the patient why they take the antibiotics (usually because of joint replacement).    Do you have a history of a joint replacements within the past 2 years? no   If yes, will likely require pre-operative antibiotics. Ask if orthopaedic surgeon has prescribed pre-operative antibiotics to take before procedures/dental work?    Do you take any OTC medications that thin your blood (Aspirin, Aleve, Ibuprofen) or supplements that thin your blood (fish oil, garlic, vitamin E, Ginko Biloba)? no    Do you take any prescribed medications that thin your blood (Coumadin, Plavix, Xarelto, Eliquis or another prescribed blood thinner)? no    Do you have an allergy to lidocaine or epinephrine? no    Do you have allergies to Iodine? no    Do you wear a lidocaine patch? no    Have you ever been diagnosed with HIV, AIDS, Hep B and Hep C? no    Do you use a cane, walker or wheelchair? no    Is the patient from a nursing home? no If yes, Is there any special accommodations that is needed for patient n/a    Do you smoke? yes: 1 pack per day       If yes,  patient to try and stop 2 days before surgery and 7 days after the surgery. Minimizing smoking as much as possible during this time will improve healing and the cosmetic result after surgery.    Do you use supplemental oxygen? If so, how many liters and can you be off it for a short period of time? N/a    Date scheduled: 2/6 at 8 with Dr Rice    Coordination of Care with other provider (Oculoplastics, Plastics, ENT) required? no   IF YES, PLEASE FORWARD TO APPROPRIATE PERSONNEL TO HELP COORDINATE.    Are there remaining tumors to be scheduled? no    Was Prior Authorization obtained? No (please use [email or web address] to document prior auth)

## 2024-10-17 NOTE — LETTER
Raúl Cadena     1975    [address] [place] [address]    Dear Raúl Cadena,    You are scheduled to have the MOHS procedure on February 6, 2025 at 8 am for eyebrow with Dr.Nadia Rice. Our office is located in [facility] our address is [address] [place] [address]. Once you arrive please check in with our front staff in [address] and they will escort you to the MOHS waiting room.  If you have someone bringing you to your appointment they may wait in the waiting room or accompany you in your visit.      Below you will find some pre-op instructions along with some information regarding the MOHS procedure.     If you have any questions please call our office at [phone].       Thank you,    [facility]         PRE-OPERATIVE INSTRUCTIONS - MOHS    Before your scheduled surgery, there are a number of important precautions and positive steps you should take to help prepare yourself for a successful treatment and speedy recovery.    Some of the steps, which are listed below, may seem unnecessary and inconvenient, but they are important. For example, when you stop smoking, you increase your ability to heal. Occasionally, there may be valid reasons, personal or medical, why you can't comply. In such cases, please call the office so we can discuss possible ways to overcome any obstacles you may be encountering.    If you have any questions about the surgery, or remember additional medical information that you forgot to mention to our staff, please contact the office prior to your surgery.    GENERAL INFORMATION REGARDING MOHS MICROGRAPHIC SURGERY    Mohs surgery is a specialized technique for the removal of skin cancer developed by Dr. Frederick Mohs over 50 years ago to improve the cure rates of skin cancer. Traditionally, skin cancers are treated by destructive methods (radiation, freezing, scraping, and burning) or  excision (cutting out the tissue with standards margins and sending it to an outside laboratory for testing). These methods all yield cure rates between 65%-94%. However, for cancers located in cosmetically sensitive areas, large tumors, or tumors unsuccessfully treated by other means, Mohs surgery offers a higher cure rate. In most cases, Mohs surgery provides you with a 99% cure rate for primary (previously untreated) basal cell cancer and a 95% cure rate for primary squamous cell cancer. In Mohs surgery, tissue is removed and processed in a way that we are able to check 100% of the margins, giving the highest cure rate for any method of treating skin cancers while providing maximal preservation of normal skin. This allows the surgeon to produce an optimal cosmetic result for the patient by maximizing the amount of tissue removed yielding as small a scar as possible    On the day of surgery, you will be given local anesthesia only (similar to what was given to you during your initial biopsy). You will remain awake. You will verify the location of the skin cancer prior to the onset of the surgery. Once the area is numb, the tissue containing the skin cancer will be removed, taking a small safety margin. This margin is usually smaller than what would be taken with a standard excision. Once the tissue is removed, it is marked and oriented. The first layer (“Stage I”) will be processed in our laboratory. The wound will be treated for bleeding and a bandage will be placed to keep you comfortable while you wait an approximate 45 minutes-1 hour (for the processing of the tissue) in your room. Your Mohs surgeon will examine the pathology in the lab, checking all the margins. If any tumor remains, you will need to take a second layer of skin (“Stage 2”). The area will be re-anesthetized and your Mohs surgeon will remove more skin only in the area where the tumor exists. This process will continue until all the skin cancer  is removed. Unfortunately, there is no method to predict how many layers or stages will be taken.    Once the tumor has been removed completely, we will discuss the best ways to close the defect. Most wounds may be closed with stitches. A larger wound may require a skin graft or a flap. In rare instances, especially for cancers around the eye or for larger cancers, we may work with another surgeon (oculoplastic, ENT, plastics) with special skills to assist with reconstruction.  If the surgery is coordinated with another specialist, you will have the Mohs portion of the procedure first and see the coordinated specialist after the skin cancer has been removed.  Always follow the pre-operative instructions of the surgeon doing the closure.      Medications: Please take all your normal medications the morning of your surgery. If you are a diabetic, please bring your insulin or medications with you, as well as a snack to avoid having low blood sugar during your day with us.    1) Blood Thinners    VERY IMPORTANT: We do NOT stop or hold prescribed blood thinners (such as Coumadin/Warfarin, Plavix, Eliquis, Pradaxa, Brilinta, Apixaban, Xarelto, Lovonox, Rivaroxaban, or Aggrenox) before Mohs surgery. Additionally, If you take aspirin because you have had a stroke, heart attack, heart disease, other condition, or your physician has prescribed you to take it, please continue your aspirin.    Although there is a risk of increased bruising and bleeding, we are still able to safely perform surgery while continuing these medications. Please NEVER stop your prescribed blood thinner without the managing doctor's (the doctor that prescribed the medication) permission or knowledge. If you have any concerns about not holding your blood thinner, please address these with your Mohs surgeon.    Most people should stop all non-steroidal anti-inflammatory medications (Motrin, Naproxen, Advil, Midol, Aleve, etc.) for 7 days prior to your  scheduled surgery and 2 days after (unless instructed otherwise after surgery).  You may take Tylenol for pain.     Vitamins and Supplements  Avoid taking any supplements with Vitamin E, Fish Oil, Gingko, Ginseng, and Garlic for 2 weeks before and 2 days after your surgery. These thin your blood.    Lidocaine Patches  Avoid wearing any over the counter or prescribed Lidocaine patches the day of the surgery.    Alcohol: Avoid drinking alcohol for 2 days prior to your surgery, and for 2 days afterwards (it thins the blood and causes more bruising and swelling).    Smoking: Try to STOP or reduce smoking significantly the week before your surgery, and especially the week afterwards (it greatly improves how well you heal). Tobacco smoke deprives the blood of oxygen, which is urgently needed by the wound during the healing process.    Contact Lenses: Do not wear them on the day of the surgery. Instead, wear glasses and bring your case, in case we need to remove them.    Clothing: Do not wear your nicest clothing on your surgery day. We recommend wearing a button down shirt that will not disrupt your post-operative dressing when changing later that night. Please avoid wearing jeans during the procedure to help prevent damage to our equipment.     Bathing: On the morning of your surgery, you may bathe or shower normally. If you get your hair done on a weekly basis, remember to get your hair washed the day before surgery.   - You will need to keep your surgical site dry for a minimum of 48 hours after surgery.    Makeup: If your surgery is on the face, please do not wear any makeup on the day of the surgery.    Jewelry: Please try to avoid wearing jewelry on the day of surgery.    Food: On the morning of surgery, have breakfast but limit your intake of caffeinated beverages. They are diuretic and may inconvenience you during surgery. If you are following up with another surgeon the same day as your Mohs surgery, you must  receive permission to eat breakfast from that surgeon.      What to bring with you on the day of your surgery:  Bring snacks - Since you could be at the office long, you may bring snacks and/or lunch with you. Some snacks and drinks are available at the office as well.   Bring a sweater - Bring a sweater or jacket that buttons or zips down the front and will not disturb your wound dressing during removal.  Bring something to do - You will be spending much of the day in our office. There will be 45-60 minute waiting periods  between layers/stages, and while there is a television with cable in every room, it is nice to have something to keep you occupied such as books, magazines, knitting, music, or work.     Planning Ahead:  Other Appointments - It is important to realize that no matter how small the skin cancer appears to be, looks can be deceiving. Since your surgery may last the entire day, you should not schedule any other appointments that day.  Special Occasions - Surgery often creates swelling and bruising. Also, the post-op dressing may be rather large and obvious. Keep this in mind as you arrange your social/work schedule. If an important event is already planned, please check with your referring physician or your Mohs surgeon to see if the surgery can be postponed.  Activity Limits after Surgery - If surgery was performed on your face, we recommend that you keep your activity level to a minimum for 2-3 days (the blood pressure elevation related to exercise can lead to bleeding). If you have stitches in an area that will be under tension or significant movement (neck, back, arms, legs), you will need to avoid heavy lifting (anything over 5 lbs) or exercise for at least 2 weeks and possibly longer. We also advise that you limit out of town travel for the first 7 days after surgery. You should also wait at least 7 days before going into a pool or the ocean.  Housework - Since you will need to minimize activity  after surgery, plan to do your groceries, laundry, gardening, and other heavy household chores prior to your surgery. Please make arrangements for assistance during the post-op period. If surgery is around your mouth area, you may need to eat soft foods, such as soup, milkshakes, or yogurt for 48 hours.    Purchasing bandage supplies: Prior to surgery, please purchase the following items to care for your surgical wound properly.  Cotton swabs (Q-tips)  Vaseline or Aquaphor  Telfa pads (or any non-stick dressing)  Paper tape or Hypafix tape  Gauze pads (3x3)    Transportation: It is often reassuring and comforting to have a  drive you to and from the surgery. He or she is welcome to wait in the office during the surgery. If you do not have a , you may drive to and from your procedure (unless stated otherwise). If the site being treated is near your eye, be aware that the final bandage may cause some obstruction of vision.     Rescheduling: If you need to reschedule your surgery, please notify the office as soon as possible.

## 2024-10-17 NOTE — PROGRESS NOTES
[facility]  [address], [address], [place] [address] [phone]    Patient Identification: Raúl Cadena is a 48 y.o. male     History of Present Illness: The patient is a 48 y.o.  year-old male  who presents to the office for discussion of pathology results. Patient had a punch biopsy preformed 9/12/2024 of a right eyebrow lesion. Here to discuss results and plan of care.  Patient is doing well at this time and has no concerns. States biopsy site is healing well.  Patient has no complaints at this time.    Past Medical History:   Diagnosis Date    COPD (chronic obstructive pulmonary disease) ([facility]) 2010    I usually get bronchitis at least once a year    GERD (gastroesophageal reflux disease)     Lactose intolerance     Otitis media     Since birth          Review of Systems  Constitutional: Denies fevers, chills or pain.  Skin: Denies any warmth, erythema, edema or mucopurulent drainage.     Physical Exam  General: AAOx3, cooperative, no distress  Face: Right eyebrow biopsy site healing well.    Assessment and Plan:  The patient is an 48 y.o.  year-old male who presents to the office for biopsy pathology review.        Component    Case Report   Surgical Pathology Report                         Case: [id number]                                   Authorizing Provider:  Supriya Hair,    Collected:           09/12/2024 Sara RUSSO                                                                         Ordering Location:     [facility]astic and      Received:            09/12/2024 [facility]                                      Reconstructive Surgery                                                                              [place]                                                                     Pathologist:           Srinivas White MD                                                       Specimen:    Lesion,  Eyebrow                                                                            Final Diagnosis   A. Skin, Eyebrow:  BASAL CELL CARCINOMA, NODULAR            -At today's visit reviewed pathology results with patient, consistent with BCC. Educated patient on diagnosis, all questions answered. Discussed options of treatment including wide local excision VS Mohs surgery. Dicussed details of both options as well as risks/benefits of both. All questions answered. Patient will like to schedule with Mohs, agree with plan. Mohs referral sent. Also placed referral to establish care with Dermatology.   -The patient is to return with our office as needed at this time.  -The patient is to call the office with any questions or concerns. All of the patient's questions were answered at this time and they agree with the plan of care.    I have spent a total time of 25 minutes in caring for this patient on the day of the visit/encounter including Diagnostic results, Risks and benefits of tx options, Counseling / Coordination of care, and Documenting in the medical record.    Leah Pickard PA-C  [facility]

## 2024-11-19 DIAGNOSIS — R74.8 ELEVATED ALKALINE PHOSPHATASE LEVEL: ICD-10-CM

## 2024-11-20 RX ORDER — VARENICLINE TARTRATE 1 MG/1
1 TABLET, FILM COATED ORAL 2 TIMES DAILY
Qty: 60 TABLET | Refills: 1 | Status: SHIPPED | OUTPATIENT
Start: 2024-11-20

## 2024-12-20 ENCOUNTER — OFFICE VISIT (OUTPATIENT)
Dept: FAMILY MEDICINE CLINIC | Facility: CLINIC | Age: 49
End: 2024-12-20
Payer: COMMERCIAL

## 2024-12-20 VITALS
OXYGEN SATURATION: 98 % | DIASTOLIC BLOOD PRESSURE: 70 MMHG | SYSTOLIC BLOOD PRESSURE: 122 MMHG | WEIGHT: 166 LBS | HEIGHT: 66 IN | BODY MASS INDEX: 26.68 KG/M2 | HEART RATE: 82 BPM | TEMPERATURE: 97.5 F

## 2024-12-20 DIAGNOSIS — F32.0 CURRENT MILD EPISODE OF MAJOR DEPRESSIVE DISORDER WITHOUT PRIOR EPISODE (HCC): Primary | ICD-10-CM

## 2024-12-20 DIAGNOSIS — Z13.31 POSITIVE DEPRESSION SCREENING: ICD-10-CM

## 2024-12-20 DIAGNOSIS — R53.83 TIREDNESS: ICD-10-CM

## 2024-12-20 DIAGNOSIS — R53.82 CHRONIC FATIGUE: ICD-10-CM

## 2024-12-20 PROCEDURE — 99214 OFFICE O/P EST MOD 30 MIN: CPT | Performed by: NURSE PRACTITIONER

## 2024-12-20 RX ORDER — ESCITALOPRAM OXALATE 5 MG/1
5 TABLET ORAL DAILY
Qty: 30 TABLET | Refills: 1 | Status: SHIPPED | OUTPATIENT
Start: 2024-12-20 | End: 2025-01-19

## 2024-12-20 NOTE — PATIENT INSTRUCTIONS
MAGNESIUM 400 mg tablets at bedtime   CO Q10 supplements     GET LABS     Once results come in I will send you a message to start the medication

## 2024-12-20 NOTE — PROGRESS NOTES
Name: Raúl Cadena      : 1975      MRN: [id number]  Encounter Provider: LUIZ Awad  Encounter Date: 2024   Encounter department: [facility]  :  Assessment & Plan  Current mild episode of major depressive disorder without prior episode (HCC)  Emotional support provided   Started on low dose lexapro   Will follow up in 2 weeks for lab review and tolerance of low dose lexapro and increase in dosing - does have history of PTSD   Denies any suicidal or homicidal ideations   Discussed current management  Discussed medications and reportable signs and symptoms  Discussed stress reduction  Incorporate 20 minutes walk deep breathing yoga  And continue follow-up with therapy especially if the medications are not working as planned  If any suicidal homicidal ideation or any panic attacks please go to the ER   Depression Screening Follow-up Plan: Patient's depression screening was positive with a PHQ-2 score of 3. Their PHQ-9 score was 9. Patient with underlying depression and was advised to continue current medications as prescribed. Patient assessed for underlying major depression. They have no active suicidal ideations. Brief counseling provided and recommend additional follow-up/re-evaluation next office visit. Continue regular follow-up with their psychologist/therapist/psychiatrist who is managing their mental health condition(s). Patient advised to follow-up with PCP for further management.    Orders:    escitalopram (LEXAPRO) 5 mg tablet; Take 1 tablet (5 mg total) by mouth daily    Magnesium 400 MG CAPS; Take 1 capsule (400 mg total) by mouth in the morning    Ambulatory Referral to Sleep Medicine; Future    Ambulatory referral to Psych Services; Future    Positive depression screening  Discussed positive depression screen - discussed treatment options and follow up with therapy   Orders:    Ambulatory referral to Psych Services; Future    Chronic  fatigue  Hydrate well   Sleep hygiene   Take vitamins and follow up labs with PCP   Rest well   If any sleep issues or restless sleep please let us know   Increase activity and reduce carbs and sweets    Orders:    Testosterone    Ambulatory Referral to Sleep Medicine; Future    Tiredness  Get labs done   Will call with results   Ordering sleep study   Orders:    Testosterone    Ambulatory Referral to Sleep Medicine; Future          Depression Screening and Follow-up Plan: Patient's depression screening was positive with a PHQ-2 score of 3. Their PHQ-9 score was 9. Patient assessed for underlying major depression. Brief counseling provided and recommend additional follow-up/re-evaluation next office visit. Continue regular follow-up with their mental health provider who is managing their mental health condition(s). Patient with underlying depression and was advised to continue current medications as prescribed. Patient advised to follow-up with PCP for further management.       History of Present Illness     Pt is a 49 yr old male   Presents in office to discuss excessive fatigue and tiredness and lack of drive to do anything   Feels like he could sleep all day has no drive to do much and feels that he may be depressed   Does have positive depression screen   Does have history of PTSD   Denies any suicidal or homicidal ideation   Continues to have GI issues and intermittent blood in stool has not seen GI   Reminded to do so       Review of Systems   Constitutional:  Positive for fatigue. Negative for fever and unexpected weight change.   HENT:  Negative for congestion, hearing loss, sinus pressure and sore throat.    Eyes: Negative.    Respiratory:  Negative for cough and shortness of breath.         Smoker    Cardiovascular:  Negative for chest pain and palpitations.        Hyperlipidemia      Gastrointestinal:  Positive for abdominal distention and diarrhea. Negative for abdominal pain and nausea.        Has not  "seen GI   Reminded him to do so    Endocrine: Negative.    Genitourinary:  Negative for difficulty urinating and flank pain.   Musculoskeletal:  Positive for arthralgias and myalgias.   Skin:  Negative for rash.   Neurological:  Negative for headaches.   Hematological:  Negative for adenopathy.   Psychiatric/Behavioral:  Positive for sleep disturbance. Negative for self-injury and suicidal ideas. The patient is nervous/anxious.         Positive depression screen          Objective   /70 (BP Location: Left arm, Patient Position: Sitting, Cuff Size: Large)   Pulse 82   Temp 97.5 °F (36.4 °C)   Ht 5' 6\" (1.676 m)   Wt 75.3 kg (166 lb)   SpO2 98%   BMI 26.79 kg/m²      Physical Exam  Vitals and nursing note reviewed.   Constitutional:       Appearance: Normal appearance.   HENT:      Head: Atraumatic.   Cardiovascular:      Rate and Rhythm: Normal rate and regular rhythm.   Pulmonary:      Effort: Pulmonary effort is normal.      Breath sounds: Normal breath sounds.   Abdominal:      Palpations: Abdomen is soft.   Musculoskeletal:      Cervical back: Normal range of motion.      Right lower leg: No edema.      Left lower leg: No edema.   Neurological:      General: No focal deficit present.      Mental Status: He is alert and oriented to person, place, and time.   Psychiatric:         Mood and Affect: Mood normal.         Behavior: Behavior normal.      Comments: Worsening depression  here to discuss   Does have positive depression screen           Administrative Statements   I have spent a total time of 45  minutes in caring for this patient on the day of the visit/encounter including Diagnostic results, Prognosis, Risks and benefits of tx options, Instructions for management, Patient and family education, Importance of tx compliance, Risk factor reductions, Impressions, Counseling / Coordination of care, Documenting in the medical record, Reviewing / ordering tests, medicine, procedures  , and Obtaining or " reviewing history  . Topics discussed with the patient / family include symptom assessment and management, medication review, medication adjustment, and goals of care.      Depression Screening Follow-up Plan: Patient's depression screening was positive with a PHQ-2 score of 3. Their PHQ-9 score was 9. Patient with underlying depression and was advised to continue current medications as prescribed. Patient assessed for underlying major depression. They have no active suicidal ideations. Brief counseling provided and recommend additional follow-up/re-evaluation next office visit. Continue regular follow-up with their psychologist/therapist/psychiatrist who is managing their mental health condition(s). Patient advised to follow-up with PCP for further management.

## 2024-12-23 ENCOUNTER — TRANSCRIBE ORDERS (OUTPATIENT)
Dept: SLEEP CENTER | Facility: CLINIC | Age: 49
End: 2024-12-23

## 2024-12-23 DIAGNOSIS — F32.0 CURRENT MILD EPISODE OF MAJOR DEPRESSIVE DISORDER WITHOUT PRIOR EPISODE (HCC): Primary | ICD-10-CM

## 2025-01-07 ENCOUNTER — TELEPHONE (OUTPATIENT)
Age: 50
End: 2025-01-07

## 2025-01-07 NOTE — TELEPHONE ENCOUNTER
Contacted Pt. in regards to ROUTINE Referral, LVM to contact [phone] to discuss services needed at this time in order to be added to proper wait list.    Patient has been added to [facility] for TT in [facility].    Patient stated they will be self pay and would be interested in Virtual

## 2025-02-21 DIAGNOSIS — F32.0 CURRENT MILD EPISODE OF MAJOR DEPRESSIVE DISORDER WITHOUT PRIOR EPISODE (HCC): ICD-10-CM

## 2025-02-21 RX ORDER — ESCITALOPRAM OXALATE 5 MG/1
5 TABLET ORAL DAILY
Qty: 30 TABLET | Refills: 1 | Status: SHIPPED | OUTPATIENT
Start: 2025-02-21 | End: 2025-03-23

## 2025-03-13 ENCOUNTER — TELEPHONE (OUTPATIENT)
Age: 50
End: 2025-03-13

## 2025-03-13 NOTE — TELEPHONE ENCOUNTER
Patient on wait list for talk therapy services. Writer reached out to verify if Pt is still interested in service, and if would like to remain on [facility]. Spoke to Pt whom would like to remain on [facility].     No prov pref.

## 2025-05-30 ENCOUNTER — RESULTS FOLLOW-UP (OUTPATIENT)
Dept: FAMILY MEDICINE CLINIC | Facility: CLINIC | Age: 50
End: 2025-05-30

## 2025-05-30 ENCOUNTER — LAB (OUTPATIENT)
Dept: LAB | Facility: CLINIC | Age: 50
End: 2025-05-30
Payer: COMMERCIAL

## 2025-05-30 DIAGNOSIS — E87.29 HIGH ANION GAP METABOLIC ACIDOSIS: ICD-10-CM

## 2025-05-30 DIAGNOSIS — F17.213 CIGARETTE NICOTINE DEPENDENCE WITH WITHDRAWAL: Primary | ICD-10-CM

## 2025-05-30 DIAGNOSIS — E78.49 OTHER HYPERLIPIDEMIA: ICD-10-CM

## 2025-05-30 DIAGNOSIS — R74.8 ELEVATED ALKALINE PHOSPHATASE LEVEL: Primary | ICD-10-CM

## 2025-05-30 DIAGNOSIS — R74.8 ALKALINE PHOSPHATASE ELEVATION: ICD-10-CM

## 2025-05-30 LAB
BASOPHILS # BLD AUTO: 0.09 THOUSANDS/ÂΜL (ref 0–0.1)
BASOPHILS NFR BLD AUTO: 1 % (ref 0–1)
EOSINOPHIL # BLD AUTO: 0.3 THOUSAND/ÂΜL (ref 0–0.61)
EOSINOPHIL NFR BLD AUTO: 3 % (ref 0–6)
ERYTHROCYTE [DISTWIDTH] IN BLOOD BY AUTOMATED COUNT: 13 % (ref 11.6–15.1)
ERYTHROCYTE [SEDIMENTATION RATE] IN BLOOD: 24 MM/HOUR (ref 0–19)
HCT VFR BLD AUTO: 48.1 % (ref 36.5–49.3)
HGB BLD-MCNC: 15.7 G/DL (ref 12–17)
IMM GRANULOCYTES # BLD AUTO: 0.05 THOUSAND/UL (ref 0–0.2)
IMM GRANULOCYTES NFR BLD AUTO: 1 % (ref 0–2)
LYMPHOCYTES # BLD AUTO: 2.97 THOUSANDS/ÂΜL (ref 0.6–4.47)
LYMPHOCYTES NFR BLD AUTO: 33 % (ref 14–44)
MCH RBC QN AUTO: 30.9 PG (ref 26.8–34.3)
MCHC RBC AUTO-ENTMCNC: 32.6 G/DL (ref 31.4–37.4)
MCV RBC AUTO: 95 FL (ref 82–98)
MONOCYTES # BLD AUTO: 0.44 THOUSAND/ÂΜL (ref 0.17–1.22)
MONOCYTES NFR BLD AUTO: 5 % (ref 4–12)
NEUTROPHILS # BLD AUTO: 5.17 THOUSANDS/ÂΜL (ref 1.85–7.62)
NEUTS SEG NFR BLD AUTO: 57 % (ref 43–75)
NRBC BLD AUTO-RTO: 0 /100 WBCS
PLATELET # BLD AUTO: 299 THOUSANDS/UL (ref 149–390)
PMV BLD AUTO: 9.8 FL (ref 8.9–12.7)
RBC # BLD AUTO: 5.08 MILLION/UL (ref 3.88–5.62)
TSH SERPL DL<=0.05 MIU/L-ACNC: 1.57 UIU/ML (ref 0.45–4.5)
WBC # BLD AUTO: 9.02 THOUSAND/UL (ref 4.31–10.16)

## 2025-05-30 PROCEDURE — 82248 BILIRUBIN DIRECT: CPT

## 2025-05-30 PROCEDURE — 82977 ASSAY OF GGT: CPT

## 2025-05-30 PROCEDURE — 36415 COLL VENOUS BLD VENIPUNCTURE: CPT

## 2025-05-31 LAB
ALBUMIN SERPL BCG-MCNC: 4.2 G/DL (ref 3.5–5)
ALP SERPL-CCNC: 113 U/L (ref 34–104)
ALT SERPL W P-5'-P-CCNC: 24 U/L (ref 7–52)
ANION GAP SERPL CALCULATED.3IONS-SCNC: 14 MMOL/L (ref 4–13)
AST SERPL W P-5'-P-CCNC: 21 U/L (ref 13–39)
BILIRUB DIRECT SERPL-MCNC: 0.07 MG/DL (ref 0–0.2)
BILIRUB SERPL-MCNC: 0.44 MG/DL (ref 0.2–1)
BUN SERPL-MCNC: 14 MG/DL (ref 5–25)
CALCIUM SERPL-MCNC: 9.7 MG/DL (ref 8.4–10.2)
CHLORIDE SERPL-SCNC: 109 MMOL/L (ref 96–108)
CO2 SERPL-SCNC: 22 MMOL/L (ref 21–32)
CREAT SERPL-MCNC: 0.95 MG/DL (ref 0.6–1.3)
CRP SERPL QL: 5.7 MG/L
GFR SERPL CREATININE-BSD FRML MDRD: 92 ML/MIN/1.73SQ M
GGT SERPL-CCNC: 37 U/L (ref 9–64)
GLUCOSE P FAST SERPL-MCNC: 82 MG/DL (ref 65–99)
POTASSIUM SERPL-SCNC: 4.9 MMOL/L (ref 3.5–5.3)
PROT SERPL-MCNC: 6.5 G/DL (ref 6.4–8.4)
SODIUM SERPL-SCNC: 145 MMOL/L (ref 135–147)

## 2025-07-18 ENCOUNTER — TELEPHONE (OUTPATIENT)
Age: 50
End: 2025-07-18

## 2025-07-18 NOTE — TELEPHONE ENCOUNTER
Contacted patient in regard to Talk Therapy Wait List to schedule a NP appointment with Melissa Shah in the [place] location.    Patient refused to give HIPAA identifiers. Removing from wait list.

## 2025-07-23 ENCOUNTER — PROCEDURE VISIT (OUTPATIENT)
Dept: DERMATOLOGY | Facility: CLINIC | Age: 50
End: 2025-07-23

## 2025-07-23 VITALS
DIASTOLIC BLOOD PRESSURE: 78 MMHG | BODY MASS INDEX: 25.07 KG/M2 | HEIGHT: 66 IN | TEMPERATURE: 98 F | WEIGHT: 156 LBS | SYSTOLIC BLOOD PRESSURE: 124 MMHG | HEART RATE: 68 BPM

## 2025-07-23 DIAGNOSIS — C44.310 BCC (BASAL CELL CARCINOMA), FACE: ICD-10-CM

## 2025-07-23 NOTE — PROGRESS NOTES
"Mohs Procedure Note    Patient: Raúl Cadena  : 1975  MRN: [id number]  Date: 2025    History of Present Illness: The patient is a 49 y.o. male who presents with complaints of basal cell carcinoma, nodular on \"eyebrow\" (left lateral brow)    Past Medical History[1]    Past Surgical History[2]    Current Medications[3]    Allergies[4]    Physical Exam:   Vitals:    25 0800   BP: 124/78   Pulse: 68   Temp: 98 °F (36.7 °C)       Skin: 14 x 1.8 cm pink crusted patch on \"eyebrow\" - (left lateral brow)    Assessment: Biopsy proven to be positive for basal cell carcinoma, nodular on \"left\" eyebrow.     Plan: Mohs    Mohs Procedure Timeout      Flowsheet Row Most Recent Value   Timeout: 825   Patient Identity Verified: Yes   Correct Site Verified: Yes   Correct Procedure Verified: Yes            Mohs Diagnosis/Indication/Location/ID      Flowsheet Row Most Recent Value   Pathology Type Basal cell carcinoma   Anatomic Site left eyebrow   Indications for Mohs tumor location   Mohs ID [id number]            Mohs Site/Accession/Pre-Post      Flowsheet Row Most Recent Value   Original Site Identified (as submitted by referring clinician) Referral, Photo   Biopsy Accession/Specimen # (as submitted by referring clincian) [id number]   Pre-Mohs Size Length (cm) 1.4   Pre-Mohs Size Width (cm): 1.8   Post-Mohs Size-Length (cm) 1.8   Post Mohs Size-Width (cm) 1.4   Repair Type Advancement flap   Suture Type Vicryl, Monocryl plus, Fast absorbing gut   Fast Absorbing Suture Size 5   Monocryl Plus Suture Size 5   Vicryl Suture Size 4   Flap/Graft area (cm2) 6.4   Anesthetic Used 1% Lidocaine with epinephrine  [buffered]            Mohs Tumor Stage 1 Information    Level: mostly SQ, some muscle fibers  Flowsheet Row Most Recent Value   Tissue Sections (blocks) 2   Microscopic Exam Section 1: Emanating from the epidermis and infiltrating the dermis are irregularly shaped islands of basaloid keratinocytes. The cells have " scant cytoplasm and round dark nuclei. Mitotic figures and apoptotic bodies are evident. The nuclei at the periphery of the islands have a palisaded arrangement. The islands are associated with a fibromyxoid stroma and there is cleft formation between some of the islands and stroma. The pathology is consistent with nodular BCC. There was also associated dense lymphocytic inflammation.     Microscopic Exam Section 2: Emanating from the epidermis and infiltrating the dermis are irregularly shaped islands of basaloid keratinocytes. The cells have scant cytoplasm and round dark nuclei. Mitotic figures and apoptotic bodies are evident. The nuclei at the periphery of the islands have a palisaded arrangement. The islands are associated with a fibromyxoid stroma and there is cleft formation between some of the islands and stroma. The pathology is consistent with nodular BCC. There was also associated dense lymphocytic inflammation.   Tumor Clear After Stage I? No            Mohs Tumor Stage 2 Information    Level: Muscle  Flowsheet Row Most Recent Value   Tissue Sections (blocks) 1   Microscopic Exam Section 1: No tumor identified in section.   Tumor Clear After Stage II? Yes                  Patient identified, procedure verified, site identified and verified. Time out completed. Surgical removal of the lesion discussed with the patient (risks and benefits, including possibility of scarring, infection, recurrence or potential for further treatment).    I have specifically identified the site with the patient. I have discussed the fact that the patient will have a scar after the procedure regardless of granulation or repair with sutures. I have discussed that the repair options can range from granulation in some cases to linear or curvilinear closures to larger flaps or grafts.  There are sometimes flaps or grafts used that require multiples stages of surgery and will not be completed today, rather be completed over a  series of appointments. I have discussed that occasionally due to location, size or depth of the lesion I may recommend consultation with and transfer of care for further removal or the reconstruction to another provider such as ophthalmology surgery, plastic surgery, ENT surgery, or surgical oncology. There are cases in which other testing such as imaging with MRI or CT scan or testing of lymph nodes is recommended because of the nature/depth/location of tumor seen during the removal. There is a risk of injury to nerves causing temporary or permanent numbness or the inability to move muscles full such as the inability to lift eyebrows. Questions answered and verbal and written consent was obtained.    The tumor qualifies for Mohs based on AUC criteria. Dr. Rice served as the surgeon and pathologist during the procedure.    With the patient in the supine position and under adequate local anesthesia with 1% lidocaine with epinephrine 1:100,000, the defect was scrubbed with Iodine. Sterile drapes were placed from the sterile tray.      Because of the location of the surgical defect,  an advancement flap was judged to give the best possible cosmetic and functional result.  The edges of the defect were carefully debrided removing any dead or coagulated tissue.  The edges of the defect and the area to be used for advancement of tissue at the base of the flap were minimally undermined.  The advancement flap was freed up and draped over the defect. The flap was secured in place by a dermal layer of sutures and the cutaneous margins were approximated and closed by superficial sutures, as noted above.  The standing cones of tissue at the end of the excision were excised with a #15 scalpel blade and closed in two layers as described previously.      The patient tolerated the procedure well. The wound was dressed with petrolatum, a non-stick pad, and a compression dressing.     Marlena Rice MD served as the surgeon and  "pathologist during the procedure.    Postoperative care: Wound care discussed at length.  I urged the patient to call us if any problems or question should arise.     Complications: none  Post-op medications: none  Patient condition after procedure: stable  Discharge plans: Plan for follow up as planned with us in 1 month for wound check.      Scribe Attestation      I,:  Latha Lazaro MA am acting as a scribe while in the presence of the attending physician.:       I,:  Marlena Rice MD personally performed the services described in this documentation    as scribed in my presence.:                   [1]   Past Medical History:  Diagnosis Date    Basal cell carcinoma 09/12/2024    Eyebrow \"left\"-Mohs    COPD (chronic obstructive pulmonary disease) (HCC) 2010    I usually get bronchitis at least once a year    GERD (gastroesophageal reflux disease)     Lactose intolerance     Otitis media     Since birth   [2]   Past Surgical History:  Procedure Laterality Date    MOHS SURGERY Left 07/23/2025    BCC eyebrow \"left\";    [3]   Current Outpatient Medications:     dicyclomine (BENTYL) 20 mg tablet, Take 1 tablet (20 mg total) by mouth every 6 (six) hours as needed (abd cramping) (Patient not taking: Reported on 12/20/2024), Disp: 45 tablet, Rfl: 0    escitalopram (LEXAPRO) 5 mg tablet, Take 1 tablet (5 mg total) by mouth daily, Disp: 30 tablet, Rfl: 1    Magnesium 400 MG CAPS, Take 1 capsule (400 mg total) by mouth in the morning, Disp: 90 capsule, Rfl: 0  [4]   Allergies  Allergen Reactions    Lactose - Food Allergy GI Intolerance     "

## 2025-07-23 NOTE — PATIENT INSTRUCTIONS
"Mohs Microscopic Surgery After Care    WOUND CARE AFTER SURGERY:    Do NOT to remove the pressure bandage for 48 hours. Keep the area clean and dry while this bandage is on.    After removing the bandage for the first time, gently clean the area with soap and water. If the bandage is difficult to remove, getting the bandage wet in the shower will sometimes help soften the adhesive and allow it to be removed more easily.     You will now need to cleanse this area daily in the shower with gentle soap. There is no need to scrub the area. You will need to apply plain Vaseline ointment (this is over the counter and not a prescription) to the site for up to 2 weeks followed by a clean appropriately sized bandage to area.  Non stick dressing and paper tape (or Hypafix) are recommended for sensitive skin but a bandaid is fine if it covers the area well.    All your stitches will dissolve over the next two weeks. You will need to keep these moist with Vaseline and covered with a bandage over the next 2 weeks for them to dissolve appropriately.    RESTRICTIONS:     For two DAYS:   - You will need to take it very easy as this time is highest risk for bleeding. Being a \"couch potato\" during these two days is generally recommended.   - For surgeries on the face/neck/scalp: Avoid leaning down to pick things up off the floor as this brings blood up to your head. Instead, squat down to pick things up.     For two WEEKS:   - No heavy lifting (anything greater than 10 pounds)   - You can start to do slow, gentle activities such as slow walking but nothing to increase your heart rate and blood pressure too much (such as cardiovascular exercise). It is important to take it easy as there is still a risk for bleeding and a high risk popping of stitches open during this time.     If we did surgery near the eyes (including the nose, forehead, front part of your scalp, cheeks): It is VERY common to get a large amount of swelling around your " eyes (puffy eyes). Although less frequent, this can be enough to swell your eyes shut and can also come along with bruising. This should not hurt and is very expected and normal. It is typically worst at ~ 3 days out from your surgery and dramatically better 1 week post-operatively.         MANAGING YOUR PAIN AFTER SURGERY     You can expect to have some pain after surgery. This is normal. The pain is typically worse the first two days after surgery, and quickly begins to get better.     The best strategy for controlling your pain after surgery is around the clock pain control. You can take over the counter Acetaminophen (Tylenol) for discomfort, if no contraindications.     If you are taking this at the maximum dose, you can alternate this with Motrin (ibuprofen or Advil) as well. Alternating these medications with each other allows you to maximize your pain control. In addition to Tylenol and Motrin, you can use heating pads or ice packs on your incisions to help reduce your pain.     How will I alternate your regular strength over-the-counter pain medication?  You will take a dose of pain medication every three hours.   Start by taking 650 mg of Tylenol (2 pills of 325 mg)   3 hours later take 600 mg of Motrin (3 pills of 200 mg)   3 hours after taking the Motrin take 650 mg of Tylenol   3 hours after that take 600 mg of Motrin.    See example - if your first dose of Tylenol is at 12:00 PM     12:00 PM  Tylenol 650 mg (2 pills of 325 mg)    3:00 PM  Motrin 600 mg (3 pills of 200 mg)    6:00 PM  Tylenol 650 mg (2 pills of 325 mg)    9:00 PM  Motrin 600 mg (3 pills of 200 mg)    Continue alternating every 3 hours      Important:   Do not take more than 4000mg of Tylenol or 3200mg of Motrin in a 24-hour period.     What if I still have pain?   If you have pain that is not controlled with the over-the-counter pain medications (Tylenol and Motrin or Advil), don't hesitate to call our staff using the number provided.  We will help make sure you are managing your pain in the best way possible, and if necessary, we can provide a prescription for additional pain medication.       CALL OUR OFFICE IMMEDIATELY FOR ANY SIGNS OF INFECTION:    This includes fever, chills, increased redness, warmth, tenderness, severe discomfort/pain, or pus or foul smell coming from the wound. If you are experiencing any of the above, please call [facility]'s Mohs Department directly at [phone].    IF BLEEDING IS NOTICED:    Place a clean cloth over the area and apply firm pressure for thirty minutes.  Check the wound ONLY after 30 minutes of direct pressure; do not cheat and sneak a peak, as that does not count.  If bleeding persists after 30 minutes of legitimate direct pressure, then try one more round of direct pressure to the area.  Should the bleeding become heavier or not stop after the second attempt, call [facility] directly at [phone]. Your call will get routed to the dermatology surgeon on call even after hours.

## 2025-08-20 ENCOUNTER — OFFICE VISIT (OUTPATIENT)
Dept: DERMATOLOGY | Facility: CLINIC | Age: 50
End: 2025-08-20

## 2025-08-20 DIAGNOSIS — Z48.89 ENCOUNTER FOR POST SURGICAL WOUND CHECK: Primary | ICD-10-CM

## 2025-08-20 PROCEDURE — 99024 POSTOP FOLLOW-UP VISIT: CPT | Performed by: STUDENT IN AN ORGANIZED HEALTH CARE EDUCATION/TRAINING PROGRAM
